# Patient Record
(demographics unavailable — no encounter records)

---

## 2024-12-20 NOTE — PHYSICAL EXAM
[General Appearance - Alert] : alert [General Appearance - In No Acute Distress] : in no acute distress [General Appearance - Well Nourished] : well nourished [General Appearance - Well Developed] : well developed [Oriented To Time, Place, And Person] : oriented to person, place, and time [Impaired Insight] : insight and judgment were intact [Affect] : the affect was normal [Motor Tone] : muscle tone was normal in all four extremities [Abnormal Walk] : normal gait [Motor Strength] : muscle strength was normal in all four extremities [FreeTextEntry6] : MAKI, no drift

## 2024-12-20 NOTE — ASSESSMENT
[Subdural hematoma, chronic (432.1 / I62.03)] : postoperative [FreeTextEntry1] : IMPRESSION:  67F with PMH of HLD, previously on ASA 81 (Primary prevention) p/w left sided weakness, found to have large b/l subacute to chronic SDH, R>L. s/p b/l leon hole for SDH evacuation 9/2/23 and s/p b/l MMA embo 9/5/23. Patient doing clinically well, with very mild symptoms of headache. MRI head had shown resolved left subdural collection.  PLAN: Follow prn

## 2024-12-20 NOTE — HISTORY OF PRESENT ILLNESS
[FreeTextEntry1] : CLINT ZENG is a 67-year-old female with PMH of HLD, previously on ASA 81 for protection. who presented with left sided weakness, found to have large bilateral subacute to chronic subdural hematomas, R>L. The collections were iso dense initially but follow up scan had shown hematocrit layering of blood within both collections. Patient became progressively more lethargic, with worsening motor function of left upper extremity. On 9/2/23, she underwent bilateral leon hole evacuation and 9/5/23, she underwent successful bilateral MMA embolization with Dr. Ricardo. Discharged on Keppra 500mg BID for seizure prophylaxis.  Today, pt reports feeling much better. Occasionally gets a headache but mostly has itching of scalp at times.